# Patient Record
Sex: MALE | Race: ASIAN | NOT HISPANIC OR LATINO
[De-identification: names, ages, dates, MRNs, and addresses within clinical notes are randomized per-mention and may not be internally consistent; named-entity substitution may affect disease eponyms.]

---

## 2017-01-18 ENCOUNTER — APPOINTMENT (OUTPATIENT)
Dept: CARDIOLOGY | Facility: CLINIC | Age: 72
End: 2017-01-18

## 2017-01-18 VITALS
HEIGHT: 71 IN | RESPIRATION RATE: 12 BRPM | WEIGHT: 190 LBS | DIASTOLIC BLOOD PRESSURE: 80 MMHG | BODY MASS INDEX: 26.6 KG/M2 | HEART RATE: 71 BPM | SYSTOLIC BLOOD PRESSURE: 116 MMHG

## 2017-05-03 ENCOUNTER — APPOINTMENT (OUTPATIENT)
Dept: CARDIOLOGY | Facility: CLINIC | Age: 72
End: 2017-05-03

## 2017-05-10 ENCOUNTER — APPOINTMENT (OUTPATIENT)
Dept: CARDIOLOGY | Facility: CLINIC | Age: 72
End: 2017-05-10

## 2017-05-31 ENCOUNTER — APPOINTMENT (OUTPATIENT)
Dept: CARDIOLOGY | Facility: CLINIC | Age: 72
End: 2017-05-31

## 2017-06-01 VITALS
BODY MASS INDEX: 26.74 KG/M2 | SYSTOLIC BLOOD PRESSURE: 112 MMHG | WEIGHT: 191 LBS | RESPIRATION RATE: 15 BRPM | HEART RATE: 66 BPM | DIASTOLIC BLOOD PRESSURE: 70 MMHG | HEIGHT: 71 IN

## 2017-11-01 ENCOUNTER — APPOINTMENT (OUTPATIENT)
Dept: CARDIOLOGY | Facility: CLINIC | Age: 72
End: 2017-11-01
Payer: MEDICARE

## 2017-11-01 VITALS
SYSTOLIC BLOOD PRESSURE: 132 MMHG | HEART RATE: 70 BPM | WEIGHT: 185 LBS | DIASTOLIC BLOOD PRESSURE: 76 MMHG | RESPIRATION RATE: 12 BRPM | HEIGHT: 71 IN | BODY MASS INDEX: 25.9 KG/M2

## 2017-11-01 PROCEDURE — 99214 OFFICE O/P EST MOD 30 MIN: CPT

## 2017-11-01 PROCEDURE — 93000 ELECTROCARDIOGRAM COMPLETE: CPT

## 2017-11-01 RX ORDER — NAPROXEN 500 MG/1
500 TABLET ORAL
Qty: 28 | Refills: 0 | Status: ACTIVE | COMMUNITY
Start: 2017-05-18

## 2017-11-01 RX ORDER — LOTEPREDNOL ETABONATE 5 MG/ML
0.5 SUSPENSION/ DROPS OPHTHALMIC
Qty: 5 | Refills: 0 | Status: ACTIVE | COMMUNITY
Start: 2017-10-21

## 2017-11-01 RX ORDER — AMOXICILLIN 500 MG/1
500 CAPSULE ORAL
Qty: 15 | Refills: 0 | Status: ACTIVE | COMMUNITY
Start: 2017-05-18

## 2017-11-01 RX ORDER — PNEUMOCOCCAL 23-VAL P-SAC VAC 25MCG/0.5
25 VIAL (ML) INJECTION
Qty: 5 | Refills: 0 | Status: ACTIVE | COMMUNITY
Start: 2017-10-13

## 2017-11-01 RX ORDER — CHLORHEXIDINE GLUCONATE, 0.12% ORAL RINSE 1.2 MG/ML
0.12 SOLUTION DENTAL
Qty: 473 | Refills: 0 | Status: ACTIVE | COMMUNITY
Start: 2017-05-18

## 2017-11-01 RX ORDER — HYDROCODONE BITARTRATE AND ACETAMINOPHEN 5; 325 MG/1; MG/1
5-325 TABLET ORAL
Qty: 20 | Refills: 0 | Status: ACTIVE | COMMUNITY
Start: 2017-07-28

## 2017-11-01 RX ORDER — OMEGA-3-ACID ETHYL ESTERS CAPSULES 1 G/1
1 CAPSULE, LIQUID FILLED ORAL
Qty: 120 | Refills: 0 | Status: ACTIVE | COMMUNITY
Start: 2017-10-13

## 2017-11-01 RX ORDER — FLUOROMETHOLONE 1 MG/ML
0.1 SOLUTION/ DROPS OPHTHALMIC
Qty: 5 | Refills: 0 | Status: ACTIVE | COMMUNITY
Start: 2017-09-25

## 2017-11-01 RX ORDER — OFLOXACIN 3 MG/ML
0.3 SOLUTION/ DROPS OPHTHALMIC
Qty: 10 | Refills: 0 | Status: ACTIVE | COMMUNITY
Start: 2017-10-21

## 2017-11-01 RX ORDER — BROMFENAC SODIUM 0.7 MG/ML
0.07 SOLUTION/ DROPS OPHTHALMIC
Qty: 3 | Refills: 0 | Status: ACTIVE | COMMUNITY
Start: 2017-10-21

## 2017-11-01 RX ORDER — CYCLOSPORINE 0.5 MG/ML
0.05 EMULSION OPHTHALMIC
Qty: 60 | Refills: 0 | Status: ACTIVE | COMMUNITY
Start: 2016-12-22

## 2017-12-20 ENCOUNTER — APPOINTMENT (OUTPATIENT)
Dept: CARDIOLOGY | Facility: CLINIC | Age: 72
End: 2017-12-20

## 2018-01-03 ENCOUNTER — APPOINTMENT (OUTPATIENT)
Dept: CARDIOLOGY | Facility: CLINIC | Age: 73
End: 2018-01-03
Payer: MEDICARE

## 2018-01-03 VITALS
WEIGHT: 182 LBS | BODY MASS INDEX: 25.48 KG/M2 | HEIGHT: 71 IN | SYSTOLIC BLOOD PRESSURE: 122 MMHG | HEART RATE: 71 BPM | DIASTOLIC BLOOD PRESSURE: 71 MMHG | RESPIRATION RATE: 15 BRPM

## 2018-01-03 PROCEDURE — 99214 OFFICE O/P EST MOD 30 MIN: CPT

## 2018-02-28 ENCOUNTER — APPOINTMENT (OUTPATIENT)
Dept: CARDIOLOGY | Facility: CLINIC | Age: 73
End: 2018-02-28
Payer: MEDICARE

## 2018-02-28 PROCEDURE — 93000 ELECTROCARDIOGRAM COMPLETE: CPT

## 2018-02-28 PROCEDURE — 99214 OFFICE O/P EST MOD 30 MIN: CPT

## 2018-02-28 RX ORDER — PANCRELIPASE 60000; 12000; 38000 [USP'U]/1; [USP'U]/1; [USP'U]/1
12000-38000 CAPSULE, DELAYED RELEASE PELLETS ORAL
Refills: 0 | Status: ACTIVE | COMMUNITY

## 2018-03-01 VITALS
SYSTOLIC BLOOD PRESSURE: 130 MMHG | WEIGHT: 181 LBS | BODY MASS INDEX: 25.34 KG/M2 | HEIGHT: 71 IN | DIASTOLIC BLOOD PRESSURE: 65 MMHG | HEART RATE: 84 BPM | RESPIRATION RATE: 16 BRPM

## 2018-03-01 RX ORDER — DEXLANSOPRAZOLE 30 MG/1
30 CAPSULE, DELAYED RELEASE ORAL
Refills: 0 | Status: ACTIVE | COMMUNITY

## 2018-03-01 RX ORDER — OFLOXACIN OTIC 3 MG/ML
SOLUTION AURICULAR (OTIC)
Refills: 0 | Status: ACTIVE | COMMUNITY

## 2018-03-01 RX ORDER — CYCLOSPORINE 0.5 MG/ML
0.05 EMULSION OPHTHALMIC
Refills: 0 | Status: ACTIVE | COMMUNITY

## 2018-03-01 RX ORDER — TAMSULOSIN HYDROCHLORIDE 0.4 MG/1
0.4 CAPSULE ORAL
Refills: 0 | Status: ACTIVE | COMMUNITY

## 2018-03-01 RX ORDER — LIFITEGRAST 50 MG/ML
5 SOLUTION/ DROPS OPHTHALMIC
Refills: 0 | Status: ACTIVE | COMMUNITY

## 2018-03-01 RX ORDER — FLUOCINONIDE 0.5 MG/G
0.05 CREAM TOPICAL
Refills: 0 | Status: ACTIVE | COMMUNITY

## 2018-03-01 RX ORDER — NAPROXEN 375 MG/1
375 TABLET ORAL
Refills: 0 | Status: ACTIVE | COMMUNITY

## 2018-03-01 RX ORDER — TRIAMCINOLONE ACETONIDE 5 MG/G
CREAM TOPICAL
Refills: 0 | Status: ACTIVE | COMMUNITY

## 2018-03-01 RX ORDER — CALCIUM CARBONATE/VITAMIN D3 600 MG-10
TABLET ORAL
Refills: 0 | Status: ACTIVE | COMMUNITY

## 2018-03-01 RX ORDER — BROMFENAC SODIUM 0.7 MG/ML
0.07 SOLUTION/ DROPS OPHTHALMIC
Refills: 0 | Status: ACTIVE | COMMUNITY

## 2018-03-01 RX ORDER — LOTEPREDNOL ETABONATE 5 MG/G
0.5 GEL OPHTHALMIC
Refills: 0 | Status: ACTIVE | COMMUNITY

## 2018-03-01 RX ORDER — ESCITALOPRAM OXALATE 10 MG/1
10 TABLET ORAL
Refills: 0 | Status: ACTIVE | COMMUNITY

## 2018-03-01 RX ORDER — PNEUMOCOCCAL 23-VAL P-SAC VAC 25MCG/0.5
25 VIAL (ML) INJECTION
Refills: 0 | Status: ACTIVE | COMMUNITY

## 2018-03-01 RX ORDER — ZOLPIDEM TARTRATE 10 MG/1
10 TABLET ORAL
Refills: 0 | Status: ACTIVE | COMMUNITY

## 2018-03-01 RX ORDER — ZOLPIDEM TARTRATE 10 MG/1
10 TABLET, FILM COATED ORAL
Refills: 0 | Status: ACTIVE | COMMUNITY

## 2018-04-18 ENCOUNTER — APPOINTMENT (OUTPATIENT)
Dept: CARDIOLOGY | Facility: CLINIC | Age: 73
End: 2018-04-18
Payer: MEDICARE

## 2018-04-18 VITALS
BODY MASS INDEX: 24.22 KG/M2 | DIASTOLIC BLOOD PRESSURE: 66 MMHG | SYSTOLIC BLOOD PRESSURE: 115 MMHG | HEIGHT: 71 IN | HEART RATE: 76 BPM | WEIGHT: 173 LBS | RESPIRATION RATE: 12 BRPM

## 2018-04-18 PROCEDURE — 99214 OFFICE O/P EST MOD 30 MIN: CPT

## 2018-06-27 ENCOUNTER — APPOINTMENT (OUTPATIENT)
Dept: CARDIOLOGY | Facility: CLINIC | Age: 73
End: 2018-06-27

## 2018-07-11 ENCOUNTER — APPOINTMENT (OUTPATIENT)
Dept: CARDIOLOGY | Facility: CLINIC | Age: 73
End: 2018-07-11
Payer: MEDICARE

## 2018-07-11 VITALS
RESPIRATION RATE: 15 BRPM | SYSTOLIC BLOOD PRESSURE: 125 MMHG | BODY MASS INDEX: 24.08 KG/M2 | DIASTOLIC BLOOD PRESSURE: 81 MMHG | HEART RATE: 65 BPM | WEIGHT: 172 LBS | HEIGHT: 71 IN

## 2018-07-11 PROCEDURE — 93000 ELECTROCARDIOGRAM COMPLETE: CPT

## 2018-07-11 PROCEDURE — 99214 OFFICE O/P EST MOD 30 MIN: CPT

## 2018-09-12 ENCOUNTER — APPOINTMENT (OUTPATIENT)
Dept: CARDIOLOGY | Facility: CLINIC | Age: 73
End: 2018-09-12

## 2018-09-19 ENCOUNTER — APPOINTMENT (OUTPATIENT)
Dept: CARDIOLOGY | Facility: CLINIC | Age: 73
End: 2018-09-19
Payer: MEDICARE

## 2018-09-19 VITALS
RESPIRATION RATE: 12 BRPM | HEIGHT: 71 IN | DIASTOLIC BLOOD PRESSURE: 78 MMHG | WEIGHT: 170 LBS | SYSTOLIC BLOOD PRESSURE: 115 MMHG | BODY MASS INDEX: 23.8 KG/M2 | HEART RATE: 75 BPM

## 2018-09-19 PROCEDURE — 99214 OFFICE O/P EST MOD 30 MIN: CPT

## 2018-09-19 PROCEDURE — 93000 ELECTROCARDIOGRAM COMPLETE: CPT

## 2018-09-19 RX ORDER — LIDOCAINE 5 G/100G
5 OINTMENT TOPICAL
Refills: 0 | Status: ACTIVE | COMMUNITY

## 2018-09-19 RX ORDER — ECONAZOLE NITRATE 10 MG/G
1 CREAM TOPICAL
Refills: 0 | Status: ACTIVE | COMMUNITY

## 2018-09-19 RX ORDER — PANCRELIPASE 120000; 24000; 76000 [USP'U]/1; [USP'U]/1; [USP'U]/1
24000-76000 CAPSULE, DELAYED RELEASE PELLETS ORAL
Refills: 0 | Status: ACTIVE | COMMUNITY

## 2018-11-14 ENCOUNTER — APPOINTMENT (OUTPATIENT)
Dept: CARDIOLOGY | Facility: CLINIC | Age: 73
End: 2018-11-14
Payer: MEDICARE

## 2018-11-14 VITALS
RESPIRATION RATE: 15 BRPM | DIASTOLIC BLOOD PRESSURE: 70 MMHG | BODY MASS INDEX: 23.94 KG/M2 | HEART RATE: 64 BPM | SYSTOLIC BLOOD PRESSURE: 135 MMHG | HEIGHT: 71 IN | WEIGHT: 171 LBS

## 2018-11-14 PROCEDURE — 93000 ELECTROCARDIOGRAM COMPLETE: CPT

## 2018-11-14 PROCEDURE — 99214 OFFICE O/P EST MOD 30 MIN: CPT

## 2018-11-14 NOTE — HISTORY OF PRESENT ILLNESS
[FreeTextEntry1] : 1. Bradycardia and PVCs. Patient has had a history of PVCs. BB not started last year due to bradycardia noted on Holter. \par He had an echo last visit that showed mild AI. EF is normal. ET is slowly improving.

## 2018-11-14 NOTE — PHYSICAL EXAM
[General Appearance - Well Developed] : well developed [Normal Appearance] : normal appearance [Well Groomed] : well groomed [General Appearance - Well Nourished] : well nourished [No Deformities] : no deformities [General Appearance - In No Acute Distress] : no acute distress [Normal Conjunctiva] : the conjunctiva exhibited no abnormalities [Eyelids - No Xanthelasma] : the eyelids demonstrated no xanthelasmas [Normal Oral Mucosa] : normal oral mucosa [No Oral Pallor] : no oral pallor [No Oral Cyanosis] : no oral cyanosis [Normal Jugular Venous A Waves Present] : normal jugular venous A waves present [Normal Jugular Venous V Waves Present] : normal jugular venous V waves present [No Jugular Venous Chamberlain A Waves] : no jugular venous chamberlain A waves [Respiration, Rhythm And Depth] : normal respiratory rhythm and effort [Exaggerated Use Of Accessory Muscles For Inspiration] : no accessory muscle use [Auscultation Breath Sounds / Voice Sounds] : lungs were clear to auscultation bilaterally [Heart Rate And Rhythm] : heart rate and rhythm were normal [Heart Sounds] : normal S1 and S2 [Murmurs] : no murmurs present [FreeTextEntry1] : bradycardic S1 S2  [Abdomen Soft] : soft [Abdomen Tenderness] : non-tender [Abdomen Mass (___ Cm)] : no abdominal mass palpated [Abnormal Walk] : normal gait [Nail Clubbing] : no clubbing of the fingernails [Cyanosis, Localized] : no localized cyanosis [Petechial Hemorrhages (___cm)] : no petechial hemorrhages [Skin Color & Pigmentation] : normal skin color and pigmentation [] : no rash [No Venous Stasis] : no venous stasis [Skin Lesions] : no skin lesions [No Skin Ulcers] : no skin ulcer [No Xanthoma] : no  xanthoma was observed [Oriented To Time, Place, And Person] : oriented to person, place, and time [Affect] : the affect was normal [Mood] : the mood was normal [No Anxiety] : not feeling anxious

## 2018-11-14 NOTE — DISCUSSION/SUMMARY
[FreeTextEntry1] : 73 M with bradycardia and aortic regurgitation for f/u.\par Monitor for now.\par Normal LVEF on echo.\par Mild AI noted.\par PVCs improved.

## 2018-11-14 NOTE — REASON FOR VISIT
[Follow-Up - Clinic] : a clinic follow-up of [FreeTextEntry1] : 73 M bradycardia abnormal EKG PACs and atrial tachycardia for f/u. \par

## 2018-12-19 ENCOUNTER — APPOINTMENT (OUTPATIENT)
Dept: CARDIOLOGY | Facility: CLINIC | Age: 73
End: 2018-12-19
Payer: MEDICARE

## 2018-12-19 VITALS
WEIGHT: 170 LBS | RESPIRATION RATE: 12 BRPM | HEIGHT: 71 IN | SYSTOLIC BLOOD PRESSURE: 126 MMHG | DIASTOLIC BLOOD PRESSURE: 82 MMHG | HEART RATE: 75 BPM | BODY MASS INDEX: 23.8 KG/M2

## 2018-12-19 PROCEDURE — 99214 OFFICE O/P EST MOD 30 MIN: CPT

## 2018-12-19 NOTE — HISTORY OF PRESENT ILLNESS
[FreeTextEntry1] : 1. Bradycardia and PVCs. Patient has had a history of PVCs. BB not started last year due to bradycardia noted on Holter. \par He had an echo last visit that showed mild AI. EF is normal. ET is slowly improving. \par  Patient reports improvement in his SOB.\par He is for colonoscopy. He denies CP. \par

## 2018-12-19 NOTE — DISCUSSION/SUMMARY
[FreeTextEntry1] : 73 M hx of bradycardia mild aortic regurgitation for colonoscopy.\par Patient is at a low level of cardiovascular risk for low risk procedure.\par No contraindication for colonoscopy.\par No further cardiac testing needed.

## 2019-02-20 ENCOUNTER — APPOINTMENT (OUTPATIENT)
Dept: CARDIOLOGY | Facility: CLINIC | Age: 74
End: 2019-02-20
Payer: MEDICARE

## 2019-02-20 VITALS
DIASTOLIC BLOOD PRESSURE: 79 MMHG | HEART RATE: 63 BPM | HEIGHT: 71 IN | RESPIRATION RATE: 15 BRPM | WEIGHT: 171 LBS | BODY MASS INDEX: 23.94 KG/M2 | SYSTOLIC BLOOD PRESSURE: 113 MMHG

## 2019-02-20 PROCEDURE — 93000 ELECTROCARDIOGRAM COMPLETE: CPT

## 2019-02-20 PROCEDURE — 99214 OFFICE O/P EST MOD 30 MIN: CPT

## 2019-02-20 RX ORDER — DEXLANSOPRAZOLE 60 MG/1
60 CAPSULE, DELAYED RELEASE ORAL
Qty: 30 | Refills: 0 | Status: ACTIVE | COMMUNITY
Start: 2019-01-14

## 2019-02-20 RX ORDER — PANCRELIPASE 36000; 180000; 114000 [USP'U]/1; [USP'U]/1; [USP'U]/1
36000-114000 CAPSULE, DELAYED RELEASE PELLETS ORAL
Qty: 90 | Refills: 0 | Status: ACTIVE | COMMUNITY
Start: 2018-09-24

## 2019-02-20 NOTE — HISTORY OF PRESENT ILLNESS
[FreeTextEntry1] : 1. Bradycardia and PVCs. Patient has had a history of PVCs.\par He also has bradycardia.\par 2. AI: mild AI noted on echo with normal LVEF.\par Patient denies CP or SOB. EKG today shows HR 49.\par Denies palpitations or dizziness. ET is preserved.

## 2019-02-20 NOTE — REASON FOR VISIT
[Follow-Up - Clinic] : a clinic follow-up of [Abnormal ECG] : an abnormal ECG [FreeTextEntry1] : 73 M bradycardia abnormal EKG PACs and atrial tachycardia for f/u.

## 2019-04-24 ENCOUNTER — APPOINTMENT (OUTPATIENT)
Dept: CARDIOLOGY | Facility: CLINIC | Age: 74
End: 2019-04-24

## 2019-06-05 ENCOUNTER — APPOINTMENT (OUTPATIENT)
Dept: CARDIOLOGY | Facility: CLINIC | Age: 74
End: 2019-06-05
Payer: MEDICARE

## 2019-06-05 VITALS
RESPIRATION RATE: 12 BRPM | BODY MASS INDEX: 24.5 KG/M2 | WEIGHT: 175 LBS | HEART RATE: 51 BPM | DIASTOLIC BLOOD PRESSURE: 75 MMHG | SYSTOLIC BLOOD PRESSURE: 107 MMHG | HEIGHT: 71 IN

## 2019-06-05 PROCEDURE — 93000 ELECTROCARDIOGRAM COMPLETE: CPT

## 2019-06-05 PROCEDURE — 99214 OFFICE O/P EST MOD 30 MIN: CPT

## 2019-06-05 RX ORDER — ZOLPIDEM TARTRATE 5 MG/1
5 TABLET ORAL
Qty: 30 | Refills: 0 | Status: ACTIVE | COMMUNITY
Start: 2019-04-15

## 2019-06-05 RX ORDER — ICOSAPENT ETHYL 1000 MG/1
1 CAPSULE ORAL
Qty: 120 | Refills: 0 | Status: ACTIVE | COMMUNITY
Start: 2019-04-15

## 2019-06-05 NOTE — REASON FOR VISIT
[Follow-Up - Clinic] : a clinic follow-up of [FreeTextEntry1] : 74  M bradycardia abnormal EKG PACs and atrial tachycardia for f/u. \par

## 2019-06-05 NOTE — DISCUSSION/SUMMARY
[FreeTextEntry1] : 74 M abnormal EKG bradycardia for f/u of PVCs.\par PVCs improved.\par COntinue to monitor bradycardia.

## 2019-06-05 NOTE — HISTORY OF PRESENT ILLNESS
[FreeTextEntry1] : 1. Bradycardia and PVCs. PVcs improved.\par Palpitations improved.\par 2. AI: mild AI noted on echo with normal LVEF.\par Patient denies CP or SOB.\par Patient denies any new symptoms.\par HR in 50s. Normal ET.\par Compliant with medications.

## 2019-06-05 NOTE — PHYSICAL EXAM
[General Appearance - Well Developed] : well developed [Well Groomed] : well groomed [Normal Appearance] : normal appearance [General Appearance - Well Nourished] : well nourished [No Deformities] : no deformities [General Appearance - In No Acute Distress] : no acute distress [Normal Conjunctiva] : the conjunctiva exhibited no abnormalities [Eyelids - No Xanthelasma] : the eyelids demonstrated no xanthelasmas [Normal Oral Mucosa] : normal oral mucosa [No Oral Pallor] : no oral pallor [No Oral Cyanosis] : no oral cyanosis [Normal Jugular Venous A Waves Present] : normal jugular venous A waves present [No Jugular Venous Chamberlain A Waves] : no jugular venous chamberlain A waves [Normal Jugular Venous V Waves Present] : normal jugular venous V waves present [Respiration, Rhythm And Depth] : normal respiratory rhythm and effort [Exaggerated Use Of Accessory Muscles For Inspiration] : no accessory muscle use [Auscultation Breath Sounds / Voice Sounds] : lungs were clear to auscultation bilaterally [Heart Sounds] : normal S1 and S2 [Heart Rate And Rhythm] : heart rate and rhythm were normal [Murmurs] : no murmurs present [FreeTextEntry1] : bradycardic S1 S2  [Abdomen Soft] : soft [Abdomen Tenderness] : non-tender [Abdomen Mass (___ Cm)] : no abdominal mass palpated [Abnormal Walk] : normal gait [Nail Clubbing] : no clubbing of the fingernails [Cyanosis, Localized] : no localized cyanosis [Petechial Hemorrhages (___cm)] : no petechial hemorrhages [] : no rash [Skin Color & Pigmentation] : normal skin color and pigmentation [No Venous Stasis] : no venous stasis [No Skin Ulcers] : no skin ulcer [Skin Lesions] : no skin lesions [No Xanthoma] : no  xanthoma was observed [Oriented To Time, Place, And Person] : oriented to person, place, and time [Affect] : the affect was normal [Mood] : the mood was normal [No Anxiety] : not feeling anxious

## 2019-08-07 ENCOUNTER — APPOINTMENT (OUTPATIENT)
Dept: CARDIOLOGY | Facility: CLINIC | Age: 74
End: 2019-08-07

## 2019-09-11 ENCOUNTER — APPOINTMENT (OUTPATIENT)
Dept: CARDIOLOGY | Facility: CLINIC | Age: 74
End: 2019-09-11
Payer: MEDICARE

## 2019-09-11 VITALS
HEART RATE: 53 BPM | WEIGHT: 174 LBS | RESPIRATION RATE: 16 BRPM | DIASTOLIC BLOOD PRESSURE: 85 MMHG | BODY MASS INDEX: 24.36 KG/M2 | HEIGHT: 71 IN | SYSTOLIC BLOOD PRESSURE: 124 MMHG

## 2019-09-11 PROCEDURE — 99214 OFFICE O/P EST MOD 30 MIN: CPT

## 2019-09-11 PROCEDURE — 93000 ELECTROCARDIOGRAM COMPLETE: CPT

## 2019-09-11 NOTE — DISCUSSION/SUMMARY
[FreeTextEntry1] : 74 M abnormal EKG  {PVCs AI for f/u.\par Continue to monitor symptoms. \par Continue current meds.\par Exercise and diet.

## 2019-09-11 NOTE — HISTORY OF PRESENT ILLNESS
[FreeTextEntry1] : 1. Bradycardia and PVCs. PVcs improved.\par Compliant with medications.\par 2. AI: mild AI noted on echo with normal LVEF.\par ET is stable. Denies CP or SOB.\par No dizziness.

## 2019-09-11 NOTE — REASON FOR VISIT
[Follow-Up - Clinic] : a clinic follow-up of [Abnormal ECG] : an abnormal ECG [FreeTextEntry1] : 74 M bradycardia abnormal EKG PACs and atrial tachycardia for f/u. \par

## 2019-09-11 NOTE — PHYSICAL EXAM
[General Appearance - Well Developed] : well developed [Normal Appearance] : normal appearance [Well Groomed] : well groomed [General Appearance - Well Nourished] : well nourished [No Deformities] : no deformities [General Appearance - In No Acute Distress] : no acute distress [Normal Conjunctiva] : the conjunctiva exhibited no abnormalities [Eyelids - No Xanthelasma] : the eyelids demonstrated no xanthelasmas [Normal Oral Mucosa] : normal oral mucosa [No Oral Pallor] : no oral pallor [No Oral Cyanosis] : no oral cyanosis [Normal Jugular Venous A Waves Present] : normal jugular venous A waves present [Normal Jugular Venous V Waves Present] : normal jugular venous V waves present [No Jugular Venous Chamberlain A Waves] : no jugular venous chamberlain A waves [Respiration, Rhythm And Depth] : normal respiratory rhythm and effort [Exaggerated Use Of Accessory Muscles For Inspiration] : no accessory muscle use [Auscultation Breath Sounds / Voice Sounds] : lungs were clear to auscultation bilaterally [Heart Rate And Rhythm] : heart rate and rhythm were normal [Heart Sounds] : normal S1 and S2 [Murmurs] : no murmurs present [FreeTextEntry1] : bradycardic S1 S2  [Abdomen Soft] : soft [Abdomen Tenderness] : non-tender [Abdomen Mass (___ Cm)] : no abdominal mass palpated [Abnormal Walk] : normal gait [Nail Clubbing] : no clubbing of the fingernails [Cyanosis, Localized] : no localized cyanosis [Petechial Hemorrhages (___cm)] : no petechial hemorrhages [Skin Color & Pigmentation] : normal skin color and pigmentation [] : no rash [No Venous Stasis] : no venous stasis [No Skin Ulcers] : no skin ulcer [Skin Lesions] : no skin lesions [No Xanthoma] : no  xanthoma was observed [Oriented To Time, Place, And Person] : oriented to person, place, and time [Affect] : the affect was normal [Mood] : the mood was normal [No Anxiety] : not feeling anxious

## 2019-11-06 ENCOUNTER — APPOINTMENT (OUTPATIENT)
Dept: CARDIOLOGY | Facility: CLINIC | Age: 74
End: 2019-11-06
Payer: MEDICARE

## 2019-11-06 VITALS
SYSTOLIC BLOOD PRESSURE: 122 MMHG | BODY MASS INDEX: 24.22 KG/M2 | RESPIRATION RATE: 12 BRPM | HEIGHT: 71 IN | DIASTOLIC BLOOD PRESSURE: 81 MMHG | HEART RATE: 54 BPM | WEIGHT: 173 LBS

## 2019-11-06 PROCEDURE — 93000 ELECTROCARDIOGRAM COMPLETE: CPT

## 2019-11-06 PROCEDURE — 99214 OFFICE O/P EST MOD 30 MIN: CPT

## 2019-11-06 NOTE — DISCUSSION/SUMMARY
[FreeTextEntry1] : 74 M with bradycardia PVCs mild AI for f/u.\par Continue to monitor for symptoms.\par Normal LVEF on echo.\par Continue current medications.

## 2019-11-06 NOTE — HISTORY OF PRESENT ILLNESS
[FreeTextEntry1] : 1. Bradycardia and PVCs. PVcs improved.\par Compliant with medications. Patient denies palpitations or dizziness. \par 2. AI: mild AI noted on echo with normal LVEF at the last visit.\par Cp and SOB have improved. ET is limited by R knee pain.\par No leg edema noted. \par

## 2020-02-05 ENCOUNTER — APPOINTMENT (OUTPATIENT)
Dept: CARDIOLOGY | Facility: CLINIC | Age: 75
End: 2020-02-05
Payer: MEDICARE

## 2020-02-05 VITALS
SYSTOLIC BLOOD PRESSURE: 106 MMHG | BODY MASS INDEX: 24.5 KG/M2 | RESPIRATION RATE: 16 BRPM | HEART RATE: 61 BPM | DIASTOLIC BLOOD PRESSURE: 78 MMHG | WEIGHT: 175 LBS | HEIGHT: 71 IN

## 2020-02-05 PROCEDURE — 99214 OFFICE O/P EST MOD 30 MIN: CPT

## 2020-02-05 NOTE — DISCUSSION/SUMMARY
[FreeTextEntry1] : 74 M PACs bradycardia palpitations for f/u.\par Palpitations improved.\par Monitor AI.\par Monitor bradycardia.

## 2020-02-05 NOTE — REASON FOR VISIT
[Follow-Up - Clinic] : a clinic follow-up of [Abnormal ECG] : an abnormal ECG [FreeTextEntry1] : 74 M bradycardia abnormal EKG PACs and atrial tachycardia for f/u.

## 2020-02-05 NOTE — PHYSICAL EXAM
[General Appearance - Well Developed] : well developed [Normal Appearance] : normal appearance [Well Groomed] : well groomed [General Appearance - Well Nourished] : well nourished [No Deformities] : no deformities [General Appearance - In No Acute Distress] : no acute distress [Normal Conjunctiva] : the conjunctiva exhibited no abnormalities [Eyelids - No Xanthelasma] : the eyelids demonstrated no xanthelasmas [Normal Oral Mucosa] : normal oral mucosa [No Oral Cyanosis] : no oral cyanosis [No Oral Pallor] : no oral pallor [No Jugular Venous Chamberlain A Waves] : no jugular venous chamberlain A waves [Normal Jugular Venous V Waves Present] : normal jugular venous V waves present [Normal Jugular Venous A Waves Present] : normal jugular venous A waves present [Respiration, Rhythm And Depth] : normal respiratory rhythm and effort [Heart Rate And Rhythm] : heart rate and rhythm were normal [Exaggerated Use Of Accessory Muscles For Inspiration] : no accessory muscle use [Auscultation Breath Sounds / Voice Sounds] : lungs were clear to auscultation bilaterally [Heart Sounds] : normal S1 and S2 [Murmurs] : no murmurs present [FreeTextEntry1] : bradycardic S1 S2  [Abdomen Soft] : soft [Abdomen Tenderness] : non-tender [Abnormal Walk] : normal gait [Abdomen Mass (___ Cm)] : no abdominal mass palpated [Petechial Hemorrhages (___cm)] : no petechial hemorrhages [Cyanosis, Localized] : no localized cyanosis [Nail Clubbing] : no clubbing of the fingernails [] : no rash [Skin Color & Pigmentation] : normal skin color and pigmentation [No Venous Stasis] : no venous stasis [Skin Lesions] : no skin lesions [No Xanthoma] : no  xanthoma was observed [No Skin Ulcers] : no skin ulcer [Oriented To Time, Place, And Person] : oriented to person, place, and time [Affect] : the affect was normal [Mood] : the mood was normal [No Anxiety] : not feeling anxious

## 2020-02-05 NOTE — HISTORY OF PRESENT ILLNESS
[FreeTextEntry1] : 1. Bradycardia and PVCs. PVcs improved. Patient reports improvement in palpitations.\par ET is preserved. No dizziness noted. \par  \par 2. AI: mild AI noted on echo with normal LVEF at the last visit. Patient denies CP or SOB.\par Compliant with  medications.\par

## 2020-07-15 ENCOUNTER — APPOINTMENT (OUTPATIENT)
Dept: CARDIOLOGY | Facility: CLINIC | Age: 75
End: 2020-07-15

## 2020-09-30 ENCOUNTER — APPOINTMENT (OUTPATIENT)
Dept: CARDIOLOGY | Facility: CLINIC | Age: 75
End: 2020-09-30
Payer: MEDICARE

## 2020-09-30 VITALS
SYSTOLIC BLOOD PRESSURE: 116 MMHG | WEIGHT: 174 LBS | DIASTOLIC BLOOD PRESSURE: 75 MMHG | HEART RATE: 59 BPM | RESPIRATION RATE: 12 BRPM | HEIGHT: 71 IN | BODY MASS INDEX: 24.36 KG/M2

## 2020-09-30 PROCEDURE — 99214 OFFICE O/P EST MOD 30 MIN: CPT

## 2020-09-30 PROCEDURE — 93000 ELECTROCARDIOGRAM COMPLETE: CPT

## 2020-09-30 NOTE — REASON FOR VISIT
[Follow-Up - Clinic] : a clinic follow-up of [FreeTextEntry1] : 75 M bradycardia abnormal EKG PACs and atrial tachycardia for f/u. \par  \par

## 2020-09-30 NOTE — HISTORY OF PRESENT ILLNESS
[FreeTextEntry1] : History of Present Illness\par 1. Bradycardia and PVCs.  \par  \par 2. AI: mild AI noted on echo with normal LVEF at the last visit. \par \par Patient has not seen me for several months. He has noted intermittent CP, midline, substernal, without radiation, lasting minutes and associated with dizziness and SOB. ET is stable. \par He is compliant with medications. \par  \par \par \par

## 2020-09-30 NOTE — PHYSICAL EXAM
[General Appearance - Well Developed] : well developed [Normal Appearance] : normal appearance [Well Groomed] : well groomed [General Appearance - Well Nourished] : well nourished [No Deformities] : no deformities [General Appearance - In No Acute Distress] : no acute distress [Normal Oral Mucosa] : normal oral mucosa [Eyelids - No Xanthelasma] : the eyelids demonstrated no xanthelasmas [Normal Conjunctiva] : the conjunctiva exhibited no abnormalities [No Oral Pallor] : no oral pallor [No Oral Cyanosis] : no oral cyanosis [Normal Jugular Venous V Waves Present] : normal jugular venous V waves present [Normal Jugular Venous A Waves Present] : normal jugular venous A waves present [No Jugular Venous Chamberlain A Waves] : no jugular venous chamberlain A waves [Respiration, Rhythm And Depth] : normal respiratory rhythm and effort [Exaggerated Use Of Accessory Muscles For Inspiration] : no accessory muscle use [Auscultation Breath Sounds / Voice Sounds] : lungs were clear to auscultation bilaterally [Murmurs] : no murmurs present [Heart Rate And Rhythm] : heart rate and rhythm were normal [Heart Sounds] : normal S1 and S2 [FreeTextEntry1] : bradycardic S1 S2  [Abdomen Soft] : soft [Abdomen Tenderness] : non-tender [Abnormal Walk] : normal gait [Abdomen Mass (___ Cm)] : no abdominal mass palpated [Nail Clubbing] : no clubbing of the fingernails [Petechial Hemorrhages (___cm)] : no petechial hemorrhages [Cyanosis, Localized] : no localized cyanosis [Skin Color & Pigmentation] : normal skin color and pigmentation [No Venous Stasis] : no venous stasis [] : no rash [Skin Lesions] : no skin lesions [No Skin Ulcers] : no skin ulcer [No Xanthoma] : no  xanthoma was observed [Oriented To Time, Place, And Person] : oriented to person, place, and time [Affect] : the affect was normal [Mood] : the mood was normal [No Anxiety] : not feeling anxious

## 2020-09-30 NOTE — DISCUSSION/SUMMARY
[FreeTextEntry1] : 75 M bradycardia AI PVCs with CP and SOB.\par CHECK ECHOCARDIOGRAM.\par Monitor bradycardia.\par Condition deteriorating, see me after tests.

## 2020-11-04 ENCOUNTER — APPOINTMENT (OUTPATIENT)
Dept: CARDIOLOGY | Facility: CLINIC | Age: 75
End: 2020-11-04
Payer: MEDICARE

## 2020-11-04 VITALS
HEART RATE: 61 BPM | DIASTOLIC BLOOD PRESSURE: 78 MMHG | HEIGHT: 71 IN | WEIGHT: 175 LBS | RESPIRATION RATE: 15 BRPM | BODY MASS INDEX: 24.5 KG/M2 | SYSTOLIC BLOOD PRESSURE: 122 MMHG

## 2020-11-04 PROCEDURE — 99214 OFFICE O/P EST MOD 30 MIN: CPT

## 2020-11-04 PROCEDURE — 99072 ADDL SUPL MATRL&STAF TM PHE: CPT

## 2020-11-04 NOTE — REASON FOR VISIT
[Follow-Up - Clinic] : a clinic follow-up of [FreeTextEntry1] : 75 M bradycardia abnormal EKG PACs and atrial tachycardia for f/u. \par

## 2020-11-04 NOTE — HISTORY OF PRESENT ILLNESS
[FreeTextEntry1] :  \par  \par 1. Bradycardia and PVCs. No palpitations noted.\par  \par 2. AI: mild AI noted on echo with normal LVEF in . He reports improvement in CP and SOB. ET is stable.\par No cough or fevers noted.\par He received his flu shot.\par

## 2020-11-04 NOTE — DISCUSSION/SUMMARY
[FreeTextEntry1] : 75 M AI PVCs bradycardia for f/u.\par Echo reviewed.\par Monitor PVCs.\par Monitor AI.

## 2021-01-06 ENCOUNTER — APPOINTMENT (OUTPATIENT)
Dept: CARDIOLOGY | Facility: CLINIC | Age: 76
End: 2021-01-06
Payer: MEDICARE

## 2021-01-06 VITALS
BODY MASS INDEX: 24.36 KG/M2 | HEIGHT: 71 IN | WEIGHT: 174 LBS | HEART RATE: 78 BPM | SYSTOLIC BLOOD PRESSURE: 114 MMHG | DIASTOLIC BLOOD PRESSURE: 68 MMHG | RESPIRATION RATE: 12 BRPM

## 2021-01-06 PROCEDURE — 99072 ADDL SUPL MATRL&STAF TM PHE: CPT

## 2021-01-06 PROCEDURE — 93000 ELECTROCARDIOGRAM COMPLETE: CPT

## 2021-01-06 PROCEDURE — 99214 OFFICE O/P EST MOD 30 MIN: CPT

## 2021-01-06 NOTE — DISCUSSION/SUMMARY
[FreeTextEntry1] : 75 M with bradycardia PVCs and AI.\par Monitor AI and PVCs for now.\par Continue exercise and diet.\par Social distancing and symptom monitoring. \par  \par

## 2021-01-06 NOTE — REASON FOR VISIT
[Follow-Up - Clinic] : a clinic follow-up of [Hyperlipidemia] : hyperlipidemia [Hypertension] : hypertension [FreeTextEntry1] : 75 M bradycardia abnormal EKG PACs and atrial tachycardia for f/u. \par

## 2021-01-06 NOTE — HISTORY OF PRESENT ILLNESS
[FreeTextEntry1] :  \par 1. Bradycardia and PVCs. No palpitations noted.\par  \par 2. AI: mild AI noted on echo with normal LVEF in . \par Patient denies any new symptoms.\par No cough or fevers.\par Patient reports good ET.

## 2021-03-03 ENCOUNTER — APPOINTMENT (OUTPATIENT)
Dept: CARDIOLOGY | Facility: CLINIC | Age: 76
End: 2021-03-03
Payer: MEDICARE

## 2021-03-03 VITALS
BODY MASS INDEX: 24.22 KG/M2 | HEIGHT: 71 IN | WEIGHT: 173 LBS | SYSTOLIC BLOOD PRESSURE: 131 MMHG | HEART RATE: 86 BPM | DIASTOLIC BLOOD PRESSURE: 77 MMHG | RESPIRATION RATE: 15 BRPM

## 2021-03-03 PROCEDURE — 99214 OFFICE O/P EST MOD 30 MIN: CPT

## 2021-03-03 PROCEDURE — 99072 ADDL SUPL MATRL&STAF TM PHE: CPT

## 2021-03-03 NOTE — HISTORY OF PRESENT ILLNESS
[FreeTextEntry1] :   \par 1. Bradycardia and PVCs. Patient is compliant with medications.\par  \par 2. AI: mild AI noted on echo with normal LVEF in . \par \par He denies any new symptoms. He denies cough or fevers. ET is stable.

## 2021-03-03 NOTE — DISCUSSION/SUMMARY
[FreeTextEntry1] : 75 M bradycardia abnormal EKG AI for f/u.\par Monitor AI for now. Last echo showed normal LVEF.\par Monitor bradycardia.\par Patient is eligible for the COVID vaccine.

## 2021-05-26 ENCOUNTER — APPOINTMENT (OUTPATIENT)
Dept: CARDIOLOGY | Facility: CLINIC | Age: 76
End: 2021-05-26
Payer: MEDICARE

## 2021-05-26 VITALS
SYSTOLIC BLOOD PRESSURE: 111 MMHG | HEIGHT: 71 IN | BODY MASS INDEX: 24.08 KG/M2 | HEART RATE: 75 BPM | WEIGHT: 172 LBS | RESPIRATION RATE: 12 BRPM | DIASTOLIC BLOOD PRESSURE: 74 MMHG

## 2021-05-26 PROCEDURE — 99214 OFFICE O/P EST MOD 30 MIN: CPT

## 2021-05-26 PROCEDURE — 99072 ADDL SUPL MATRL&STAF TM PHE: CPT

## 2021-05-26 PROCEDURE — 93000 ELECTROCARDIOGRAM COMPLETE: CPT

## 2021-05-26 RX ORDER — ATORVASTATIN CALCIUM 10 MG/1
10 TABLET, FILM COATED ORAL
Refills: 0 | Status: ACTIVE | COMMUNITY
Start: 2021-05-26

## 2021-05-26 NOTE — REASON FOR VISIT
[Arrhythmia/ECG Abnorrmalities] : arrhythmia/ECG abnormalities [Hyperlipidemia] : hyperlipidemia [FreeTextEntry1] : 76 M bradycardia abnormal EKG PACs and atrial tachycardia hyperlipidemia for f/u. \par \par

## 2021-05-26 NOTE — DISCUSSION/SUMMARY
[FreeTextEntry1] : 76 M hyperlipidemia AI PVCs bradycardia for f/u.\par EKG for PVCs.\par Monitor PVCs for now.\par Continue statin for hyperlipidemia.\par Monitor aortic regurgitation.\par Patient received both doses of his COVID vaccine.

## 2021-05-26 NOTE — HISTORY OF PRESENT ILLNESS
[FreeTextEntry1] :  \par 1. Bradycardia and PVCs: his palpitations have improved. \par 2. AI: mild AI noted on echo with normal LVEF in . \par 3. Hyperipidemia: on statin.\par \par The patient has received both doses of his COVID vaccine.\par His palpitations and dizziness have improved. ET is normal.\par Patient is compliant with medications.

## 2021-07-28 ENCOUNTER — APPOINTMENT (OUTPATIENT)
Dept: CARDIOLOGY | Facility: CLINIC | Age: 76
End: 2021-07-28
Payer: MEDICARE

## 2021-07-28 VITALS
BODY MASS INDEX: 23.94 KG/M2 | RESPIRATION RATE: 15 BRPM | HEART RATE: 87 BPM | DIASTOLIC BLOOD PRESSURE: 70 MMHG | HEIGHT: 71 IN | SYSTOLIC BLOOD PRESSURE: 116 MMHG | WEIGHT: 171 LBS

## 2021-07-28 PROCEDURE — 99214 OFFICE O/P EST MOD 30 MIN: CPT

## 2021-07-28 PROCEDURE — 93000 ELECTROCARDIOGRAM COMPLETE: CPT

## 2021-07-28 PROCEDURE — 99072 ADDL SUPL MATRL&STAF TM PHE: CPT

## 2021-07-28 NOTE — DISCUSSION/SUMMARY
[FreeTextEntry1] : 76 M bradycardia abnormal EKG PACs and atrial tachycardia hyperlipidemia for f/u. \par Continue medications for hyperlipidemia.\par EKG for bradycardia.\par CHECK ECHOCARDIOGRAM DUE TO SOB AND DIZZINESS.

## 2021-07-28 NOTE — REASON FOR VISIT
[FreeTextEntry1] : 76 M bradycardia abnormal EKG PACs and atrial tachycardia hyperlipidemia for f/u. \par \par

## 2021-07-28 NOTE — HISTORY OF PRESENT ILLNESS
[FreeTextEntry1] : \par   \par 1. Bradycardia and PVCs: patient has noted intermittent CP and SOB over the last few months. The symptoms are exertional and relieved with rest. His symptoms occur daily and last a few minutes. \par 2. AI:  seen on echo. \par 3. Hyperipidemia: on statin. No muscle pain is noted.\par \par The patient has received both doses of his COVID vaccine.\par  \par He was noted to be bradycardic with dizziness and referred for evaluation. His symptoms occur almost daily and last up to 30 minutes and are associated with exertional SOB.

## 2021-10-20 ENCOUNTER — APPOINTMENT (OUTPATIENT)
Dept: CARDIOLOGY | Facility: CLINIC | Age: 76
End: 2021-10-20
Payer: MEDICARE

## 2021-10-20 VITALS
DIASTOLIC BLOOD PRESSURE: 78 MMHG | HEART RATE: 75 BPM | RESPIRATION RATE: 12 BRPM | BODY MASS INDEX: 23.8 KG/M2 | WEIGHT: 170 LBS | HEIGHT: 71 IN | SYSTOLIC BLOOD PRESSURE: 122 MMHG

## 2021-10-20 PROCEDURE — 99214 OFFICE O/P EST MOD 30 MIN: CPT

## 2021-10-20 NOTE — HISTORY OF PRESENT ILLNESS
[FreeTextEntry1] :  \par  \par 1. Bradycardia and PVCs: his palpitations have improved. \par 2. AI: seen on echo in 8/2021. His AI is mild with normal LVEF.\par 3. Hyperipidemia: on statin. No muscle pain is noted.\par \par The patient has received both doses of his COVID vaccine.\par  \par He was noted to be bradycardic.\par His ET is stable.\par \par He denies any new symptoms.

## 2021-10-20 NOTE — DISCUSSION/SUMMARY
[FreeTextEntry1] : 76 M hyperlipidemia AI PVCs for f/u.\par Continue statin for hyperlipidemia.\par Continue to monitor PVCs and AI.\par Echo reviewed.\par Patient received his COVID vaccine.

## 2021-10-20 NOTE — REASON FOR VISIT
[Hyperlipidemia] : hyperlipidemia [Coronary Artery Disease] : coronary artery disease [FreeTextEntry1] : 76 M bradycardia abnormal EKG PACs and atrial tachycardia hyperlipidemia for f/u. \par \par

## 2021-12-22 ENCOUNTER — APPOINTMENT (OUTPATIENT)
Dept: CARDIOLOGY | Facility: CLINIC | Age: 76
End: 2021-12-22
Payer: MEDICARE

## 2021-12-22 VITALS
SYSTOLIC BLOOD PRESSURE: 115 MMHG | RESPIRATION RATE: 15 BRPM | HEART RATE: 68 BPM | WEIGHT: 171 LBS | DIASTOLIC BLOOD PRESSURE: 65 MMHG | HEIGHT: 71 IN | BODY MASS INDEX: 23.94 KG/M2

## 2021-12-22 PROCEDURE — 99214 OFFICE O/P EST MOD 30 MIN: CPT

## 2021-12-22 NOTE — REASON FOR VISIT
[Arrhythmia/ECG Abnorrmalities] : arrhythmia/ECG abnormalities [Structural Heart and Valve Disease] : structural heart and valve disease [FreeTextEntry1] : 76 M bradycardia abnormal EKG PACs and atrial tachycardia hyperlipidemia for f/u. \par [phone]\par Dr. Arthur Abbasi\par Echo 9-2021

## 2021-12-22 NOTE — DISCUSSION/SUMMARY
[FreeTextEntry1] : 76 M hyperlipidemia AI PVCs for f/u.\par Continue to monitor PVCs.\par Monitor aortic regurgitation.\par Continue medications for hyperlipidemia.\par Patient received his COVID vaccine. \par \par

## 2021-12-22 NOTE — HISTORY OF PRESENT ILLNESS
[FreeTextEntry1] : \par   \par 1. Bradycardia and PVCs: his palpitations have improved. \par 2. AI: seen on echo in 8/2021. The patient denies CP or SOB. No cough or fevers noted.\par 3. Hyperipidemia: on statin. No muscle pain is noted.\par \par The patient has received both doses of his COVID vaccine.\par  \par He was noted to be bradycardic.\par \par He denies palpitations. His dizziness has improved. His ET is stable.\par \par The patient is compliant with medications.

## 2022-03-23 ENCOUNTER — APPOINTMENT (OUTPATIENT)
Dept: CARDIOLOGY | Facility: CLINIC | Age: 77
End: 2022-03-23
Payer: MEDICARE

## 2022-03-23 VITALS
WEIGHT: 170 LBS | DIASTOLIC BLOOD PRESSURE: 77 MMHG | RESPIRATION RATE: 12 BRPM | BODY MASS INDEX: 23.8 KG/M2 | HEART RATE: 78 BPM | SYSTOLIC BLOOD PRESSURE: 124 MMHG | HEIGHT: 71 IN

## 2022-03-23 PROCEDURE — 99214 OFFICE O/P EST MOD 30 MIN: CPT

## 2022-03-23 NOTE — DISCUSSION/SUMMARY
[FreeTextEntry1] : 76 M hyperlipidemia AI PVCs for f/u.\par Continue to monitor PVCs.\par Monitor aortic regurgitation.\par Continue medications for hyperlipidemia.\par Patient received his COVID vaccine. \par

## 2022-03-23 NOTE — HISTORY OF PRESENT ILLNESS
[FreeTextEntry1] :  \par 1. Bradycardia and PVCs: his palpitations have improved. \par 2. AI: seen on echo in 8/2021. The patient denies CP or SOB. No cough or fevers noted.\par 3. Hyperipidemia: on statin. ET is stable.\par \par The patient is compliant with medications.\par \par The patient has received both doses of his COVID vaccine.\par  \par He was noted to be bradycardic.\par  \par No cough or fevers noted.

## 2022-03-23 NOTE — REASON FOR VISIT
[Arrhythmia/ECG Abnorrmalities] : arrhythmia/ECG abnormalities [Hyperlipidemia] : hyperlipidemia [FreeTextEntry1] : 76 M bradycardia abnormal EKG PACs and atrial tachycardia hyperlipidemia for f/u. \par [phone]\par Dr. Arthur Abbasi\par Echo 9-2021 \par

## 2022-05-18 ENCOUNTER — APPOINTMENT (OUTPATIENT)
Dept: CARDIOLOGY | Facility: CLINIC | Age: 77
End: 2022-05-18